# Patient Record
Sex: FEMALE | Race: WHITE | Employment: OTHER | URBAN - METROPOLITAN AREA
[De-identification: names, ages, dates, MRNs, and addresses within clinical notes are randomized per-mention and may not be internally consistent; named-entity substitution may affect disease eponyms.]

---

## 2017-05-09 ENCOUNTER — HOSPITAL ENCOUNTER (EMERGENCY)
Facility: HOSPITAL | Age: 52
Discharge: HOME | End: 2017-05-09
Attending: EMERGENCY MEDICINE
Payer: COMMERCIAL

## 2017-05-09 ENCOUNTER — APPOINTMENT (OUTPATIENT)
Dept: GENERAL RADIOLOGY | Facility: HOSPITAL | Age: 52
End: 2017-05-09
Attending: EMERGENCY MEDICINE
Payer: COMMERCIAL

## 2017-05-09 VITALS
OXYGEN SATURATION: 99 % | RESPIRATION RATE: 14 BRPM | TEMPERATURE: 99 F | HEART RATE: 59 BPM | DIASTOLIC BLOOD PRESSURE: 73 MMHG | SYSTOLIC BLOOD PRESSURE: 112 MMHG

## 2017-05-09 DIAGNOSIS — S90.31XA CONTUSION OF RIGHT FOOT, INITIAL ENCOUNTER: Primary | ICD-10-CM

## 2017-05-09 PROCEDURE — 99283 EMERGENCY DEPT VISIT LOW MDM: CPT

## 2017-05-09 PROCEDURE — 73630 X-RAY EXAM OF FOOT: CPT | Performed by: EMERGENCY MEDICINE

## 2017-05-09 NOTE — ED PROVIDER NOTES
Patient Seen in: [facility] Emergency Department    History   Patient presents with: Foot Pain    Stated Complaint: dropped an object on her foot    HPI    Patient is a 60-year-old female, presenting for evaluation of right foot pain.   Patient stat well-nourished. HENT:   Head: Normocephalic and atraumatic. Eyes: Conjunctivae and EOM are normal.   Neck: Normal range of motion.    Pulmonary/Chest: Effort normal.   Musculoskeletal:        Right foot: There is normal range of motion, no tenderness, n encounter  (primary encounter diagnosis)    Disposition:  Discharge    Follow-up:  Petrona Sherman, [address] Ruthann Hermosillo   [phone]    Schedule an appointment as soon as possible for a visit in 1 week  As needed      Medication

## 2017-11-22 ENCOUNTER — HOSPITAL (OUTPATIENT)
Dept: OTHER | Age: 52
End: 2017-11-22
Attending: SPECIALIST

## 2018-10-20 ENCOUNTER — CHARTING TRANS (OUTPATIENT)
Dept: OTHER | Age: 53
End: 2018-10-20

## 2021-07-06 ENCOUNTER — OFFICE VISIT (OUTPATIENT)
Dept: FAMILY MEDICINE CLINIC | Facility: CLINIC | Age: 56
End: 2021-07-06
Payer: COMMERCIAL

## 2021-07-06 DIAGNOSIS — Z23 NEED FOR VACCINATION: Primary | ICD-10-CM

## 2021-07-06 PROCEDURE — 90715 TDAP VACCINE 7 YRS/> IM: CPT | Performed by: NURSE PRACTITIONER

## 2021-07-06 PROCEDURE — 90471 IMMUNIZATION ADMIN: CPT | Performed by: NURSE PRACTITIONER

## 2021-07-06 NOTE — PROGRESS NOTES
Patient here for TdaP immunization as she stepped on a nail yesterday while barefoot. Cleaning instructions briefly reviewed. VIS provided for all immunizations, consent given and questions answered.  Patient tolerated procedure well with no adverse effects

## 2021-12-28 PROBLEM — R90.89 ABNORMAL BRAIN MRI: Status: ACTIVE | Noted: 2020-02-18

## 2021-12-28 PROBLEM — C44.311 BASAL CELL CARCINOMA OF NOSE: Status: ACTIVE | Noted: 2020-08-31

## 2022-03-17 PROBLEM — G89.29 CHRONIC NECK PAIN: Status: ACTIVE | Noted: 2022-03-17

## 2022-03-17 PROBLEM — M54.12 LEFT CERVICAL RADICULOPATHY: Status: ACTIVE | Noted: 2022-03-17

## 2022-03-17 PROBLEM — M54.2 CHRONIC NECK PAIN: Status: ACTIVE | Noted: 2022-03-17

## 2022-05-06 ENCOUNTER — ORDER TRANSCRIPTION (OUTPATIENT)
Dept: ADMINISTRATIVE | Facility: HOSPITAL | Age: 57
End: 2022-05-06

## 2022-08-05 ENCOUNTER — HOSPITAL ENCOUNTER (OUTPATIENT)
Dept: ULTRASOUND IMAGING | Age: 57
Discharge: HOME | End: 2022-08-05
Attending: FAMILY MEDICINE

## 2022-08-05 DIAGNOSIS — Z13.9 ENCOUNTER FOR SCREENING: ICD-10-CM

## 2022-08-05 NOTE — PROGRESS NOTES
Date of Service 8/5/2022    ARSALAN August  Date of Birth 6/26/1965    Patient Age: 61year old    PCP: Sherita Kendrick DO  [address] [place] [id number]    Consult Type  Type Scan/Screening: PV Screening   (No Abdominal Aortic Aneurysm noted.)     Ankle-Brachial Index:  (Ankle Brachial Index of Blood Pressure measurements are in normal ranges.)    Left Carotid Artery: Normal  Right Carotid Artery: Normal       Body Mass Index  There is no height or weight on file to calculate BMI. Lipid Profile  No Lipid results on file in last 5 years . Nurse Review  Risk factor information and results reviewed with Nurse: Yes    Recommended Follow Up:  Consult your physician regarding[de-identified] Discuss potential for Incidental Finding;Final Peripheral Vascular Stroke Screen Report    No data recorded      Recommendations for Change:  Nutrition Changes: Low Saturated Fat; Increase Fiber;Low Fat Dairy; Low Salt Eating     Exercise: Enhance Current Program              Repeat PV Screening: 3 Years       [place] Recommended Resources:  Recommended Resources: Upcoming Classes, Medical Services and [facility]. Health. Fan Amor RN        Please Contact the Nurse Heart Line with any Questions or Concerns [phone].

## 2022-09-29 ENCOUNTER — HOSPITAL ENCOUNTER (OUTPATIENT)
Dept: CT IMAGING | Age: 57
Discharge: HOME | End: 2022-09-29
Attending: FAMILY MEDICINE

## 2022-09-29 DIAGNOSIS — Z13.6 SCREENING FOR CARDIOVASCULAR CONDITION: ICD-10-CM

## 2022-09-29 NOTE — PROGRESS NOTES
Date of Service 9/29/2022    Albertina Pineda  Date of Birth 6/26/1965    Patient Age: 61year old    PCP: Sandra Marquez DO  [address] [place] [address]    Consult Type  Type Scan/Screening: Heart Scan  Preliminary Heart Scan Score: 0                  Lipid Profile  States recent lipid panel no results available but Pt has results and understands them     Nurse Review  Risk factor information and results reviewed with Nurse: Yes    Recommended Follow Up:  Consult your physician regarding[de-identified] Final Heart Scan Report    B/p 104/78      Recommendations for Change:  Nutrition Changes: Low Saturated Fat;Low Fat Dairy; Low Salt Eating; Increase Fiber                 Repeat Heart Scan:   5 years if Calcium Score is 0.0 and Discuss with your Physician          [place] Recommended Resources:  Recommended Resources: Upcoming Classes, Medical Services and Health Library [email or web address]. Nessa Garcia RN        Please Contact the Nurse Heart Line with any Questions or Concerns [phone].

## (undated) DIAGNOSIS — Z13.9 ENCOUNTER FOR SCREENING: Primary | ICD-10-CM

## (undated) NOTE — ED AVS SNAPSHOT
[facility] Emergency Department    [place]  [address] [address]    Phone:  [phone]    Fax:  [phone]           Bree Miguel   MRN: [id number]    Department:  [facility] Emergency Department   Date of Visit:  5/9/ self-assessment the day after your visit. You may also receive a call from our patient liason soon after your visit. Also, some patients receive a detailed feedback survey mailed to them a week after the visit.   If you receive this, we would really apprec [address] [phone] [address] [facility] [id number] ([address]) [address] [place] [address] PATIENT STATED HISTORY: (As transcribed by Technologist)  Patient dropped a heavy object on the dorsal surface of right foot.                      MyChart     Visit MyChart  You can access your MyChart to more actively manage your health care and view more

## (undated) NOTE — ED AVS SNAPSHOT
[facility] Emergency Department    [place]  [address] [place] [address]    Phone:  [phone]    Fax:  [phone]           Kirt Lesches   MRN: [id number]    Department:  [facility] Emergency Department   Date of Visit:  5/9/ IF THERE IS ANY CHANGE OR WORSENING OF YOUR CONDITION, CALL YOUR PRIMARY CARE PHYSICIAN AT ONCE OR RETURN IMMEDIATELY TO THE EMERGENCY DEPARTMENT.     If you have been prescribed any medication(s), please fill your prescription right away and begin taking t